# Patient Record
Sex: FEMALE | Race: WHITE | NOT HISPANIC OR LATINO | Employment: UNEMPLOYED | URBAN - METROPOLITAN AREA
[De-identification: names, ages, dates, MRNs, and addresses within clinical notes are randomized per-mention and may not be internally consistent; named-entity substitution may affect disease eponyms.]

---

## 2024-04-25 ENCOUNTER — OFFICE VISIT (OUTPATIENT)
Dept: FAMILY MEDICINE CLINIC | Facility: CLINIC | Age: 34
End: 2024-04-25
Payer: COMMERCIAL

## 2024-04-25 VITALS
HEART RATE: 78 BPM | WEIGHT: 164.4 LBS | RESPIRATION RATE: 16 BRPM | OXYGEN SATURATION: 99 % | SYSTOLIC BLOOD PRESSURE: 116 MMHG | TEMPERATURE: 97.3 F | HEIGHT: 63 IN | DIASTOLIC BLOOD PRESSURE: 84 MMHG | BODY MASS INDEX: 29.13 KG/M2

## 2024-04-25 DIAGNOSIS — R14.0 ABDOMINAL BLOATING WITH CRAMPS: ICD-10-CM

## 2024-04-25 DIAGNOSIS — Z13.220 LIPID SCREENING: ICD-10-CM

## 2024-04-25 DIAGNOSIS — E07.9 THYROID DISORDER: ICD-10-CM

## 2024-04-25 DIAGNOSIS — R53.82 CHRONIC FATIGUE: Primary | ICD-10-CM

## 2024-04-25 DIAGNOSIS — K21.9 CHRONIC GERD: ICD-10-CM

## 2024-04-25 DIAGNOSIS — Z11.59 NEED FOR HEPATITIS C SCREENING TEST: ICD-10-CM

## 2024-04-25 DIAGNOSIS — L70.0 ACNE VULGARIS: ICD-10-CM

## 2024-04-25 DIAGNOSIS — E55.9 VITAMIN D DEFICIENCY: ICD-10-CM

## 2024-04-25 DIAGNOSIS — R10.9 ABDOMINAL BLOATING WITH CRAMPS: ICD-10-CM

## 2024-04-25 PROCEDURE — 99204 OFFICE O/P NEW MOD 45 MIN: CPT | Performed by: FAMILY MEDICINE

## 2024-04-25 RX ORDER — CLINDAMYCIN AND BENZOYL PEROXIDE 10; 50 MG/G; MG/G
1 GEL TOPICAL 2 TIMES DAILY
Qty: 50 G | Refills: 4 | Status: SHIPPED | OUTPATIENT
Start: 2024-04-25

## 2024-04-25 RX ORDER — LANOLIN ALCOHOL/MO/W.PET/CERES
400 CREAM (GRAM) TOPICAL DAILY
COMMUNITY
Start: 2024-03-01

## 2024-04-25 NOTE — PROGRESS NOTES
Subjective     Andrew York is a 34 y.o. female.     Chief Complaint   Patient presents with    Establish Care     Discuss Conception.    Acne    Menstrual Problem     Painful periods, has been 2-3 days early.     Fatigue       History of Present Illness     To establish care, discuss the followings ;     6 months ago. Trying to conceive   Has regular cycle. Associated with cramps.   She is on daily Folic acid    Abd distention with dairy pd. and fatty food.     H/o thyroid dis, was borderline , +ve FHX for thyroid dis.   Feeling fatigue and tired with dry skin.     Vit D defi; was on suppl     Facial Acne ; on/off , worse at period time . Not using any things       Labs has been ordered and personally/independently interpreted , discussed with pt         The following portions of the patient's history were reviewed and updated as appropriate: allergies, current medications, past family history, past medical history, past social history, past surgical history, and problem list.        Review of Systems   Constitutional:  Positive for fatigue.       Vitals:    04/25/24 1124   BP: 116/84   Pulse: 78   Resp: 16   Temp: 97.3 °F (36.3 °C)   SpO2: 99%           04/25/24  1124   Weight: 74.6 kg (164 lb 6.4 oz)         Body mass index is 29.13 kg/m².      Current Outpatient Medications   Medication Sig Dispense Refill    folic acid (FOLVITE) 400 MCG tablet Take 1 tablet by mouth Daily.      multivitamin with minerals (Centrum Women) tablet tablet Take 1 tablet by mouth Daily.      clindamycin-benzoyl peroxide (BenzaClin) 1-5 % gel Apply 1 Application topically to the appropriate area as directed 2 (Two) Times a Day. 50 g 4     No current facility-administered medications for this visit.                Objective   Physical Exam  Vitals and nursing note reviewed.   Constitutional:       General: She is not in acute distress.     Appearance: She is not toxic-appearing.   Cardiovascular:      Rate and Rhythm: Normal rate  and regular rhythm.      Heart sounds: Normal heart sounds. No murmur heard.  Pulmonary:      Effort: Pulmonary effort is normal. No respiratory distress.      Breath sounds: Normal breath sounds. No stridor. No wheezing or rhonchi.   Abdominal:      General: Bowel sounds are normal. There is no distension.      Palpations: Abdomen is soft. There is no mass.      Tenderness: There is no abdominal tenderness. There is no guarding or rebound.      Hernia: No hernia is present.   Skin:     Comments: Facial acne    Neurological:      Mental Status: She is alert and oriented to person, place, and time.   Psychiatric:         Mood and Affect: Mood normal.         Behavior: Behavior normal.         Thought Content: Thought content normal.           Assessment & Plan   Diagnoses and all orders for this visit:    1. Chronic fatigue (Primary)  Comments:  needs w/u  Orders:  -     CBC (No Diff)  -     Iron Profile  -     Basic Metabolic Panel  -     Vitamin B12  -     Hemoglobin A1c    2. Abdominal bloating with cramps  Comments:  ? food intolerance/allergy  Orders:  -     Food Allergen Panel With / IgE    3. Chronic GERD  Comments:  discussed diet  Orders:  -     Food Allergen Panel With / IgE    4. Need for hepatitis C screening test  -     Hepatitis C Antibody    5. Vitamin D deficiency  Comments:  NEEDS W/U  Orders:  -     Vitamin D,25-Hydroxy    6. Thyroid disorder  Comments:  needs w/u  Orders:  -     TSH Rfx On Abnormal To Free T4  -     TSH    7. Lipid screening  -     Lipid Panel    8. Acne vulgaris  Comments:  will start on;  Orders:  -     clindamycin-benzoyl peroxide (BenzaClin) 1-5 % gel; Apply 1 Application topically to the appropriate area as directed 2 (Two) Times a Day.  Dispense: 50 g; Refill: 4      I spent 50 minutes caring for this patient on this date of service. This time includes time spent by me in the following activities:preparing for the visit,reviewing previous medical records,  performing a  medically appropriate examination and/or evaluation, counseling and educating the patient/family/caregiver, and documenting information in the medical record.     Labs/XR has been ordered and personally/independently interpreted , discussed with pt         Patient was given instructions and counseling regarding her condition or for health maintenance advice. Please see specific information pulled into the AVS if appropriate.       I have fully discussed the nature of the medical condition(s) risks, complications, management, safe and proper use of medications.   Pt stated no allergy to the above prescribed medication.  I have discussed the SIDE EFFECT OF MEDICATION and importance TO report any side effect , the patient expressed good understanding.  Encouraged medication compliance and the importance of keeping scheduled follow up appointments with me and any other providers.    Patient instructed to follow up with our office for results on any labs/imaging ordered during this visit.    Home care discussed  All questions answered  Patient verbalizes understanding and agrees to treatment plan.     Follow up: Return in about 4 weeks (around 5/23/2024) for discuss labs .

## 2024-04-30 ENCOUNTER — PATIENT MESSAGE (OUTPATIENT)
Dept: FAMILY MEDICINE CLINIC | Facility: CLINIC | Age: 34
End: 2024-04-30
Payer: COMMERCIAL

## 2024-05-01 NOTE — TELEPHONE ENCOUNTER
From: Andrew York  To: Sadiq Mcghee  Sent: 4/30/2024 8:53 PM EDT  Subject: Lab Results     Hi Dr. Babcock,  I still have not received any results from my lab tests. Please advise if they will appear on my chart.  Thanks,

## 2024-05-02 LAB
25(OH)D3+25(OH)D2 SERPL-MCNC: 27 NG/ML (ref 30–100)
BUN SERPL-MCNC: 16 MG/DL (ref 6–20)
BUN/CREAT SERPL: 29 (ref 9–23)
CALCIUM SERPL-MCNC: 9.5 MG/DL (ref 8.7–10.2)
CHLORIDE SERPL-SCNC: 103 MMOL/L (ref 96–106)
CHOLEST SERPL-MCNC: 205 MG/DL (ref 100–199)
CLAM IGE QN: <0.1 KU/L
CO2 SERPL-SCNC: 22 MMOL/L (ref 20–29)
CODFISH IGE QN: <0.1 KU/L
CONV CLASS DESCRIPTION: NORMAL
CORN IGE QN: <0.1 KU/L
COW MILK IGE QN: <0.1 KU/L
CREAT SERPL-MCNC: 0.55 MG/DL (ref 0.57–1)
EGFRCR SERPLBLD CKD-EPI 2021: 122 ML/MIN/1.73
EGG WHITE IGE QN: <0.1 KU/L
ERYTHROCYTE [DISTWIDTH] IN BLOOD BY AUTOMATED COUNT: 12.6 % (ref 11.7–15.4)
GLUCOSE SERPL-MCNC: 77 MG/DL (ref 70–99)
HBA1C MFR BLD: 5.3 % (ref 4.8–5.6)
HCT VFR BLD AUTO: 39.9 % (ref 34–46.6)
HCV IGG SERPL QL IA: NON REACTIVE
HDLC SERPL-MCNC: 74 MG/DL
HGB BLD-MCNC: 13.2 G/DL (ref 11.1–15.9)
IGE SERPL-ACNC: 101 IU/ML (ref 6–495)
IRON SATN MFR SERPL: 25 % (ref 15–55)
IRON SERPL-MCNC: 72 UG/DL (ref 27–159)
LDLC SERPL CALC-MCNC: 118 MG/DL (ref 0–99)
MCH RBC QN AUTO: 30.1 PG (ref 26.6–33)
MCHC RBC AUTO-ENTMCNC: 33.1 G/DL (ref 31.5–35.7)
MCV RBC AUTO: 91 FL (ref 79–97)
PEANUT IGE QN: <0.1 KU/L
PLATELET # BLD AUTO: 260 X10E3/UL (ref 150–450)
POTASSIUM SERPL-SCNC: 4.5 MMOL/L (ref 3.5–5.2)
RBC # BLD AUTO: 4.39 X10E6/UL (ref 3.77–5.28)
SCALLOP IGE QN: <0.1 KU/L
SHRIMP IGE QN: <0.1 KU/L
SODIUM SERPL-SCNC: 139 MMOL/L (ref 134–144)
SOYBEAN IGE QN: <0.1 KU/L
TIBC SERPL-MCNC: 291 UG/DL (ref 250–450)
TRIGL SERPL-MCNC: 73 MG/DL (ref 0–149)
TSH SERPL DL<=0.005 MIU/L-ACNC: 3.22 UIU/ML (ref 0.45–4.5)
TSH SERPL DL<=0.005 MIU/L-ACNC: NORMAL UIU/ML
UIBC SERPL-MCNC: 219 UG/DL (ref 131–425)
VIT B12 SERPL-MCNC: 306 PG/ML (ref 232–1245)
VLDLC SERPL CALC-MCNC: 13 MG/DL (ref 5–40)
WALNUT IGE QN: <0.1 KU/L
WBC # BLD AUTO: 4.6 X10E3/UL (ref 3.4–10.8)
WHEAT IGE QN: <0.1 KU/L

## 2024-05-02 NOTE — TELEPHONE ENCOUNTER
I reviewed her labs results   Nothing major, can wait to discuss during next OV   I prefer face to face discussion on results

## 2024-05-23 ENCOUNTER — OFFICE VISIT (OUTPATIENT)
Dept: FAMILY MEDICINE CLINIC | Facility: CLINIC | Age: 34
End: 2024-05-23
Payer: COMMERCIAL

## 2024-05-23 VITALS
WEIGHT: 162.04 LBS | SYSTOLIC BLOOD PRESSURE: 116 MMHG | OXYGEN SATURATION: 98 % | TEMPERATURE: 97.7 F | RESPIRATION RATE: 16 BRPM | BODY MASS INDEX: 28.71 KG/M2 | DIASTOLIC BLOOD PRESSURE: 80 MMHG | HEIGHT: 63 IN | HEART RATE: 80 BPM

## 2024-05-23 DIAGNOSIS — Z00.00 ENCOUNTER FOR ANNUAL PHYSICAL EXAM: Primary | ICD-10-CM

## 2024-05-23 PROCEDURE — 99395 PREV VISIT EST AGE 18-39: CPT | Performed by: FAMILY MEDICINE

## 2024-05-23 NOTE — PROGRESS NOTES
Patient Care Team:  Sadiq Mcghee MD as PCP - General (Urgent Care)     Chief complaint: Patient is in today for a physical          Patient is a 34 y.o. female who presents for her yearly physical exam.     HPI     Doing well.   Eating HD  .    Exercising routinely.   Immunizations: reviewed and discussed.     Labs has been ordered and personally/independently interpreted , discussed with pt     Lab Results   Component Value Date    GLUCOSE 77 04/25/2024    BUN 16 04/25/2024    CREATININE 0.55 (L) 04/25/2024    EGFRRESULT 122 04/25/2024    BCR 29 (H) 04/25/2024    K 4.5 04/25/2024    CO2 22 04/25/2024    CALCIUM 9.5 04/25/2024     Lab Results   Component Value Date    WBC 4.6 04/25/2024    HGB 13.2 04/25/2024    HCT 39.9 04/25/2024    MCV 91 04/25/2024     04/25/2024     Lab Results   Component Value Date    CHLPL 205 (H) 04/25/2024    TRIG 73 04/25/2024    HDL 74 04/25/2024     (H) 04/25/2024     Lab Results   Component Value Date    HGBA1C 5.3 04/25/2024         Health maintenance/lifestyle:    There is no immunization history on file for this patient.      HM;  Pap:  due     Social History     Tobacco Use   Smoking Status Never   Smokeless Tobacco Never     Social History     Substance and Sexual Activity   Alcohol Use Not Currently    Comment: Occassionly         Review of Systems   Gastrointestinal:  Negative for abdominal pain.         History  History reviewed. No pertinent past medical history.   History reviewed. No pertinent surgical history.   No Known Allergies   Family History   Problem Relation Age of Onset    Diabetes Mother     Hypertension Mother     Thyroid disease Mother         Hypothyroidism    Diabetes Father     Hypertension Father     Diabetes Maternal Grandfather         He is diabetes since his twenties    Diabetes Maternal Uncle         My uncle is diabetes    Hypertension Sister     Thyroid disease Sister         Hypothyroidism     Social History     Socioeconomic  "History    Marital status:    Tobacco Use    Smoking status: Never    Smokeless tobacco: Never   Vaping Use    Vaping status: Never Used   Substance and Sexual Activity    Alcohol use: Not Currently     Comment: Occassionly    Drug use: Never    Sexual activity: Yes     Partners: Male     Birth control/protection: None     Comment: Trying to concieve        Current Outpatient Medications:     clindamycin-benzoyl peroxide (BenzaClin) 1-5 % gel, Apply 1 Application topically to the appropriate area as directed 2 (Two) Times a Day., Disp: 50 g, Rfl: 4    folic acid (FOLVITE) 400 MCG tablet, Take 1 tablet by mouth Daily., Disp: , Rfl:     multivitamin with minerals (Centrum Women) tablet tablet, Take 1 tablet by mouth Daily., Disp: , Rfl:                   /80   Pulse 80   Temp 97.7 °F (36.5 °C)   Resp 16   Ht 160 cm (62.99\")   Wt 73.5 kg (162 lb 0.6 oz)   SpO2 98%   BMI 28.71 kg/m²       Physical Exam  Vitals and nursing note reviewed.   Constitutional:       General: She is not in acute distress.     Appearance: She is not toxic-appearing.   HENT:      Mouth/Throat:      Mouth: Mucous membranes are moist.   Eyes:      Conjunctiva/sclera: Conjunctivae normal.   Cardiovascular:      Rate and Rhythm: Normal rate and regular rhythm.      Heart sounds: Normal heart sounds. No murmur heard.  Pulmonary:      Effort: Pulmonary effort is normal. No respiratory distress.      Breath sounds: Normal breath sounds. No stridor. No wheezing or rhonchi.   Abdominal:      General: Bowel sounds are normal. There is no distension.      Palpations: Abdomen is soft. There is no mass.      Tenderness: There is no abdominal tenderness. There is no guarding or rebound.      Hernia: No hernia is present.   Musculoskeletal:      Cervical back: Neck supple.   Skin:     General: Skin is warm.      Coloration: Skin is not jaundiced or pale.   Neurological:      Mental Status: She is alert and oriented to person, place, and " time.   Psychiatric:         Mood and Affect: Mood normal.         Behavior: Behavior normal.         Thought Content: Thought content normal.                   Diagnoses and all orders for this visit:    1. Encounter for annual physical exam (Primary)  Comments:  discussed labs results  advised to take Vit D from OTC        -Age and sex appropriate physical exam performed and documented.   -Updated past medical, family, social and surgical histories as well as allergies and care team list.   -Addressed care gaps listed in the medical record.  -advised to eat healthy diet, do daily exercise   - discussed and updates preventive screening measures         Follow up: Return in about 6 months (around 11/23/2024).  Plan of care discussed with pt. They verbalized understanding and agreement.     Sadiq Mcghee MD   5/23/2024   13:15 EDT

## 2024-06-11 ENCOUNTER — PATIENT MESSAGE (OUTPATIENT)
Dept: FAMILY MEDICINE CLINIC | Facility: CLINIC | Age: 34
End: 2024-06-11
Payer: COMMERCIAL

## 2024-10-11 ENCOUNTER — OFFICE VISIT (OUTPATIENT)
Dept: OBSTETRICS AND GYNECOLOGY | Facility: CLINIC | Age: 34
End: 2024-10-11
Payer: COMMERCIAL

## 2024-10-11 VITALS
BODY MASS INDEX: 31.36 KG/M2 | HEART RATE: 82 BPM | DIASTOLIC BLOOD PRESSURE: 72 MMHG | WEIGHT: 177 LBS | HEIGHT: 63 IN | SYSTOLIC BLOOD PRESSURE: 131 MMHG

## 2024-10-11 DIAGNOSIS — N94.6 DYSMENORRHEA: ICD-10-CM

## 2024-10-11 DIAGNOSIS — Z01.419 WELL WOMAN EXAM WITH ROUTINE GYNECOLOGICAL EXAM: Primary | ICD-10-CM

## 2024-10-11 DIAGNOSIS — Z31.9 INFERTILITY MANAGEMENT: ICD-10-CM

## 2024-10-11 NOTE — PATIENT INSTRUCTIONS
[facility]   [place], [place]   [phone]    [facility]  [place], [place]   [phone]

## 2024-10-11 NOTE — PROGRESS NOTES
Chief Complaint   Patient presents with    Annual exam     Family planning     Establish Care        Andrew York is a 34 y.o.   who presents for an annual examination   She is sexually active with her , CAMPOS x 1 year since marrying in 2023.   Moved from [place].  who is here with her has normal SA results from [facility].  No proven fertility in either partner  She reports a normal US in [facility] in Aug 2023 (transabdominal)  Pap history:  None in the past  Prior abnormal paps: no  STDs  Sexually active: yes  History of STDs: no  Contraception:  None, on PNV  Last Completed Mammogram       This patient has no relevant Health Maintenance data.            Screening for BRCA-   Is patient's family history significant for BRCA risk factors? no    History reviewed. No pertinent past medical history.  History reviewed. No pertinent surgical history.  OB History    Para Term  AB Living   0 0 0 0 0 0   SAB IAB Ectopic Molar Multiple Live Births   0 0 0 0 0 0      Social History     Tobacco Use    Smoking status: Never    Smokeless tobacco: Never   Vaping Use    Vaping status: Never Used   Substance Use Topics    Alcohol use: Not Currently     Comment: Occassionly    Drug use: Never     Family History   Problem Relation Age of Onset    Diabetes Mother     Hypertension Mother     Thyroid disease Mother         Hypothyroidism    Diabetes Father     Hypertension Father     Diabetes Maternal Grandfather         He is diabetes since his twenties    Diabetes Maternal Uncle         My uncle is diabetes    Hypertension Sister     Thyroid disease Sister         Hypothyroidism     Current Outpatient Medications on File Prior to Visit   Medication Sig Dispense Refill    folic acid (FOLVITE) 400 MCG tablet Take 1 tablet by mouth Daily.      multivitamin with minerals (Centrum Women) tablet tablet Take 1 tablet by mouth Daily.      [DISCONTINUED] clindamycin-benzoyl peroxide (BenzaClin) 1-5 % gel Apply 1  "Application topically to the appropriate area as directed 2 (Two) Times a Day. 50 g 4     No current facility-administered medications on file prior to visit.     No Known Allergies     Review of Systems  See HPI      OBJECTIVE:   Vitals:    10/11/24 1446   BP: 131/72   Pulse: 82   Weight: 80.3 kg (177 lb)   Height: 160 cm (62.99\")      Physical Exam  Exam conducted with a chaperone present.   Constitutional:       General: She is not in acute distress.     Appearance: She is well-developed. She is not diaphoretic.   HENT:      Head: Normocephalic and atraumatic.   Neck:      Thyroid: No thyromegaly.      Trachea: No tracheal deviation.   Cardiovascular:      Rate and Rhythm: Normal rate.      Heart sounds: Normal heart sounds. No murmur heard.     No friction rub. No gallop.   Pulmonary:      Effort: Pulmonary effort is normal. No respiratory distress.      Breath sounds: Normal breath sounds.   Chest:      Chest wall: No tenderness.   Breasts:     Right: No inverted nipple, mass, nipple discharge, skin change or tenderness.      Left: No inverted nipple, mass, nipple discharge, skin change or tenderness.   Abdominal:      General: There is no distension.      Palpations: Abdomen is soft. There is no mass.      Tenderness: There is no abdominal tenderness.   Genitourinary:     General: Normal vulva.      Labia:         Right: No rash, lesion or injury.         Left: No rash, lesion or injury.       Vagina: No vaginal discharge, tenderness or bleeding.      Cervix: No cervical motion tenderness, discharge or friability.      Uterus: Not deviated, not enlarged, not fixed and not tender.       Adnexa:         Right: No mass, tenderness or fullness.          Left: No mass, tenderness or fullness.     Musculoskeletal:         General: No deformity. Normal range of motion.   Lymphadenopathy:      Cervical: No cervical adenopathy.   Skin:     General: Skin is warm and dry.      Findings: No rash.   Neurological:      " Mental Status: She is alert and oriented to person, place, and time.   Psychiatric:         Behavior: Behavior normal.         Thought Content: Thought content normal.         Judgment: Judgment normal.         ASSESSMENT/PLAN:   (Z01.419) Well woman exam with routine gynecological exam - Plan: IGP, Apt HPV,rfx 16 / 18,45    (Z31.9) Infertility management - Plan: Antimullerian Hormone (AMH)    (N94.6) Dysmenorrhea - Plan: US Non-ob Transvaginal      Annual well woman exam:  Cervical cancer screening:    Denies cervical dysplasia in past   The patient is due for a pap today.    Screening guidelines discussed with patient  Breast cancer screening:    Clinical breast exam recommended for age 20-39 years every 1-3 years   Mammogram recommended starting age 40    Breast self awareness encouraged  STD Screening   Testing declines.    Contraception :   Declines, on PNV  Will get AMH as had nl TSH, A1C with PCP  Plan for US around time of ovulation  Discussed option for [facility], [facility].  Plan for OI if all results normal     Family history   Denies significant hx of genetic disease, birth defect  Healthy lifestyle counseling:   return for routine annual checkups        Return for GYN US timed for around day 10-14 of her cycle.

## 2024-10-16 ENCOUNTER — PATIENT ROUNDING (BHMG ONLY) (OUTPATIENT)
Dept: OBSTETRICS AND GYNECOLOGY | Facility: CLINIC | Age: 34
End: 2024-10-16
Payer: COMMERCIAL

## 2024-10-16 ENCOUNTER — PATIENT MESSAGE (OUTPATIENT)
Dept: OBSTETRICS AND GYNECOLOGY | Facility: CLINIC | Age: 34
End: 2024-10-16
Payer: COMMERCIAL

## 2024-10-17 LAB
CYTOLOGIST CVX/VAG CYTO: NORMAL
CYTOLOGY CVX/VAG DOC CYTO: NORMAL
CYTOLOGY CVX/VAG DOC THIN PREP: NORMAL
DX ICD CODE: NORMAL
HPV I/H RISK 4 DNA CVX QL PROBE+SIG AMP: NEGATIVE
Lab: NORMAL
Lab: NORMAL
OTHER STN SPEC: NORMAL
SPECIMEN STATUS: NORMAL
STAT OF ADQ CVX/VAG CYTO-IMP: NORMAL

## 2024-10-18 ENCOUNTER — TELEPHONE (OUTPATIENT)
Dept: OBSTETRICS AND GYNECOLOGY | Facility: CLINIC | Age: 34
End: 2024-10-18
Payer: COMMERCIAL

## 2024-10-20 LAB — MIS SERPL-MCNC: 0.82 NG/ML

## 2024-10-24 ENCOUNTER — TELEPHONE (OUTPATIENT)
Dept: OBSTETRICS AND GYNECOLOGY | Facility: CLINIC | Age: 34
End: 2024-10-24
Payer: COMMERCIAL

## 2025-01-16 ENCOUNTER — OFFICE VISIT (OUTPATIENT)
Dept: FAMILY MEDICINE CLINIC | Facility: CLINIC | Age: 35
End: 2025-01-16
Payer: COMMERCIAL

## 2025-01-16 VITALS
HEIGHT: 63 IN | DIASTOLIC BLOOD PRESSURE: 80 MMHG | RESPIRATION RATE: 16 BRPM | HEART RATE: 81 BPM | WEIGHT: 193 LBS | SYSTOLIC BLOOD PRESSURE: 110 MMHG | BODY MASS INDEX: 34.2 KG/M2 | TEMPERATURE: 97.7 F | OXYGEN SATURATION: 99 %

## 2025-01-16 DIAGNOSIS — N97.9 FEMALE INFERTILITY, PRIMARY: Primary | ICD-10-CM

## 2025-01-16 DIAGNOSIS — E07.9 THYROID DISORDER: ICD-10-CM

## 2025-01-16 PROCEDURE — 99213 OFFICE O/P EST LOW 20 MIN: CPT | Performed by: FAMILY MEDICINE

## 2025-01-16 RX ORDER — LETROZOLE 2.5 MG/1
TABLET, FILM COATED ORAL 2 TIMES DAILY
COMMUNITY
Start: 2024-11-28

## 2025-01-16 RX ORDER — LEVOTHYROXINE SODIUM 75 UG/1
75 TABLET ORAL
COMMUNITY
Start: 2025-01-11

## 2025-01-16 RX ORDER — CHORIOGONADOTROPIN ALFA 250 UG/.5ML
INJECTION, SOLUTION SUBCUTANEOUS
COMMUNITY
Start: 2025-01-03

## 2025-01-16 NOTE — PROGRESS NOTES
Subjective     Andrew York is a 34 y.o. female.     Chief Complaint   Patient presents with    Chronic fatigue    Weight Gain     Discuss.     hormone medication     Discuss     Follow-up     Discuss vitamin D and cholesterol, and DHEA         History of Present Illness     Presents for FU and update on the followings;     She is  less than 12 months ago. Trying to conceive     Primary infertility   Saw GYN in 10/2024  Had normal PAP and normal Pelvic US.  AMH test 0.8   Was referred to [facility] .   If below 1 , starts follow reproductive health   DHEA takes it daily for 2 months   Had IUI 2 x as she had good Follicles size   Her Thyroid marker wnl   She is on Letrozole for 2 months cycle.   Had IUI done with one follicle   She stopped DHEA   On Q 10   Follow at [facility] for reproductive health   Has regular cycle. Associated with cramps.   She is on daily Folic acid          Lab Results   Component Value Date    TSH 3.220 04/25/2024    TSH CANCELED 04/25/2024         The following portions of the patient's history were reviewed and updated as appropriate: allergies, current medications, past family history, past medical history, past social history, past surgical history, and problem list.        Review of Systems   Genitourinary:  Negative for menstrual problem.       Vitals:    01/16/25 1619   BP: 110/80   Pulse: 81   Resp: 16   Temp: 97.7 °F (36.5 °C)   SpO2: 99%           01/16/25  1619   Weight: 87.5 kg (193 lb)         Body mass index is 34.2 kg/m².      Current Outpatient Medications   Medication Sig Dispense Refill    Coenzyme Q10 300 MG capsule Take  by mouth Daily.      folic acid (FOLVITE) 400 MCG tablet Take 1 tablet by mouth Daily.      letrozole (FEMARA) 2.5 MG tablet 2 (Two) Times a Day. Please see attached for detailed directions, daily for 5 days.      levothyroxine (SYNTHROID, LEVOTHROID) 75 MCG tablet Take 1 tablet by mouth Every Morning.      multivitamin with minerals (Centrum Women) tablet  tablet Take 1 tablet by mouth Daily.      Prenatal MV-Min-Fe Fum-FA-DHA (PRENATAL 1 PO) Take  by mouth.      Ovidrel 250 MCG/0.5ML solution prefilled syringe INJECT 1 PREFILLED SYRINGE SUBCUTANEOUSLY (UNDER THE SKIN) AS DIRECTED BY PHYSICIAN FOR A SINGLE DOSE KEEP IN FRIDGE UNTIL USE.       No current facility-administered medications for this visit.                Objective   Physical Exam  Vitals and nursing note reviewed.   Constitutional:       General: She is not in acute distress.     Appearance: She is not toxic-appearing.   Neck:      Comments: No enlarged thyroid      Cardiovascular:      Rate and Rhythm: Normal rate and regular rhythm.      Heart sounds: Normal heart sounds. No murmur heard.  Neurological:      Mental Status: She is alert and oriented to person, place, and time.   Psychiatric:         Mood and Affect: Mood normal.         Behavior: Behavior normal.         Thought Content: Thought content normal.           Assessment & Plan   Diagnoses and all orders for this visit:    1. Female infertility, primary (Primary)  Comments:  following with IR  close monitoring    2. Thyroid disorder  Comments:  last TSH wnl  close monitoring             I spent 22 minutes caring for this patient on this date of service. This time includes time spent by me in the following activities:preparing for the visit,reviewing previous medical records,  performing a medically appropriate examination and/or evaluation, counseling and educating the patient/family/caregiver, and documenting information in the medical record.     Patient was given instructions and counseling regarding her condition or for health maintenance advice. Please see specific information pulled into the AVS if appropriate.         I have fully discussed the nature of the medical condition(s) risks, complications, management, safe and proper use of medications.   Encouraged medication compliance and the importance of keeping scheduled follow up  appointments with me and any other providers.    Patient instructed to follow up with our office for results on any labs/imaging ordered during this visit.    Home care discussed  All questions answered  Patient verbalizes understanding and agrees to treatment plan.     Follow up: Return in about 4 months (around 5/16/2025) for physical, come fasting.

## 2025-07-23 ENCOUNTER — OFFICE VISIT (OUTPATIENT)
Dept: FAMILY MEDICINE CLINIC | Facility: CLINIC | Age: 35
End: 2025-07-23
Payer: COMMERCIAL

## 2025-07-23 VITALS
WEIGHT: 182 LBS | BODY MASS INDEX: 32.25 KG/M2 | TEMPERATURE: 95.2 F | HEIGHT: 63 IN | DIASTOLIC BLOOD PRESSURE: 64 MMHG | SYSTOLIC BLOOD PRESSURE: 118 MMHG | RESPIRATION RATE: 16 BRPM

## 2025-07-23 DIAGNOSIS — E78.5 DYSLIPIDEMIA: ICD-10-CM

## 2025-07-23 DIAGNOSIS — R14.0 ABDOMINAL BLOATING WITH CRAMPS: ICD-10-CM

## 2025-07-23 DIAGNOSIS — E07.9 THYROID DISORDER: ICD-10-CM

## 2025-07-23 DIAGNOSIS — R73.02 IGT (IMPAIRED GLUCOSE TOLERANCE): ICD-10-CM

## 2025-07-23 DIAGNOSIS — R21 RASH: ICD-10-CM

## 2025-07-23 DIAGNOSIS — Z00.00 ENCOUNTER FOR ANNUAL PHYSICAL EXAM: Primary | ICD-10-CM

## 2025-07-23 DIAGNOSIS — R10.9 ABDOMINAL BLOATING WITH CRAMPS: ICD-10-CM

## 2025-07-23 DIAGNOSIS — E55.9 VITAMIN D DEFICIENCY: ICD-10-CM

## 2025-07-23 PROCEDURE — 99395 PREV VISIT EST AGE 18-39: CPT | Performed by: FAMILY MEDICINE

## 2025-07-23 RX ORDER — TRIAMCINOLONE ACETONIDE 1 MG/G
1 OINTMENT TOPICAL 2 TIMES DAILY
Qty: 80 G | Refills: 1 | Status: SHIPPED | OUTPATIENT
Start: 2025-07-23

## 2025-07-23 RX ORDER — DICYCLOMINE HYDROCHLORIDE 10 MG/1
10 CAPSULE ORAL
Qty: 60 CAPSULE | Refills: 1 | Status: SHIPPED | OUTPATIENT
Start: 2025-07-23

## 2025-07-23 NOTE — PROGRESS NOTES
Patient Care Team:  Sadiq Mcghee MD as PCP - General (Urgent Care)     Chief complaint: Patient is in today for a physical          Patient is a 35 y.o. female who presents for her yearly physical exam.     HPI     Follow with Infertility , on Rx. Plan for IVF    Vit D defi; not on suppl    Has on/off abdominal discomfort with distension, has normal BM    C/o rash on LE, itchy    Eating HD . Exercising routinely.     Immunizations: reviewed and discussed.     Lab Results   Component Value Date    CHLPL 205 (H) 04/25/2024    TRIG 73 04/25/2024    HDL 74 04/25/2024     (H) 04/25/2024     Lab Results   Component Value Date    TSH 3.220 04/25/2024    TSH CANCELED 04/25/2024       Lab Results   Component Value Date    GLUCOSE 77 04/25/2024    BUN 16 04/25/2024    CREATININE 0.55 (L) 04/25/2024     04/25/2024    K 4.5 04/25/2024     04/25/2024    CALCIUM 9.5 04/25/2024    BCR 29 (H) 04/25/2024    EGFR 122 04/25/2024         Health maintenance/lifestyle:  Immunization History   Administered Date(s) Administered    COVID-19 (PFIZER) 12YRS+ (COMIRNATY) 10/01/2024         HM;  Colorectal Screening:  Start at 45.  Pap:  follow with GYN      Social History     Tobacco Use   Smoking Status Never   Smokeless Tobacco Never     Social History     Substance and Sexual Activity   Alcohol Use Not Currently    Comment: Occassionly           History  History reviewed. No pertinent past medical history.   History reviewed. No pertinent surgical history.   No Known Allergies   Family History   Problem Relation Age of Onset    Diabetes Mother     Hypertension Mother     Thyroid disease Mother         Hypothyroidism    Diabetes Father     Hypertension Father     Diabetes Maternal Grandfather         He is diabetes since his twenties    Diabetes Maternal Uncle         My uncle is diabetes    Hypertension Sister     Thyroid disease Sister         Hypothyroidism     Social History     Socioeconomic History    Marital  "status:    Tobacco Use    Smoking status: Never    Smokeless tobacco: Never   Vaping Use    Vaping status: Never Used   Substance and Sexual Activity    Alcohol use: Not Currently     Comment: Occassionly    Drug use: Never    Sexual activity: Yes     Partners: Male     Birth control/protection: None     Comment: Trying to concieve        Current Outpatient Medications:     Coenzyme Q10 300 MG capsule, Take  by mouth Daily., Disp: , Rfl:     folic acid (FOLVITE) 400 MCG tablet, Take 1 tablet by mouth Daily., Disp: , Rfl:     levothyroxine (SYNTHROID, LEVOTHROID) 75 MCG tablet, Take 1 tablet by mouth Every Morning., Disp: , Rfl:     multivitamin with minerals (Centrum Women) tablet tablet, Take 1 tablet by mouth Daily., Disp: , Rfl:     Prenatal MV-Min-Fe Fum-FA-DHA (PRENATAL 1 PO), Take  by mouth., Disp: , Rfl:     dicyclomine (BENTYL) 10 MG capsule, Take 1 capsule by mouth 4 (Four) Times a Day Before Meals & at Bedtime., Disp: 60 capsule, Rfl: 1    triamcinolone (KENALOG) 0.1 % ointment, Apply 1 Application topically to the appropriate area as directed 2 (Two) Times a Day., Disp: 80 g, Rfl: 1                  /64   Temp 95.2 °F (35.1 °C)   Resp 16   Ht 160 cm (62.99\")   Wt 82.6 kg (182 lb)   BMI 32.25 kg/m²       Physical Exam  Vitals and nursing note reviewed.   Constitutional:       General: She is not in acute distress.     Appearance: She is not ill-appearing, toxic-appearing or diaphoretic.   HENT:      Head: Normocephalic.      Nose: Nose normal. No congestion or rhinorrhea.      Mouth/Throat:      Mouth: Mucous membranes are moist.      Pharynx: Oropharynx is clear.   Eyes:      General: No scleral icterus.     Extraocular Movements: Extraocular movements intact.      Conjunctiva/sclera: Conjunctivae normal.      Pupils: Pupils are equal, round, and reactive to light.   Neck:      Comments: No enlarged thyroid      Cardiovascular:      Rate and Rhythm: Normal rate and regular rhythm.      " Heart sounds: Normal heart sounds. No murmur heard.  Pulmonary:      Effort: Pulmonary effort is normal. No respiratory distress.      Breath sounds: Normal breath sounds. No wheezing, rhonchi or rales.   Abdominal:      General: Bowel sounds are normal. There is no distension.      Palpations: Abdomen is soft. There is no mass.      Tenderness: There is no abdominal tenderness.   Musculoskeletal:         General: Normal range of motion.      Cervical back: Normal range of motion and neck supple.      Right lower leg: No edema.      Left lower leg: No edema.   Skin:     General: Skin is warm.      Coloration: Skin is not jaundiced or pale.      Findings: Rash present.   Neurological:      General: No focal deficit present.      Mental Status: She is alert and oriented to person, place, and time.   Psychiatric:         Mood and Affect: Mood normal.         Behavior: Behavior normal.         Thought Content: Thought content normal.         Judgment: Judgment normal.                   Diagnoses and all orders for this visit:    1. Encounter for annual physical exam (Primary)  -     CBC (No Diff)  -     Comprehensive Metabolic Panel    2. Thyroid disorder  -     TSH    3. IGT (impaired glucose tolerance)  -     Hemoglobin A1c    4. Dyslipidemia  -     Lipid Panel    5. Vitamin D deficiency  -     Vitamin D 25 hydroxy    6. Abdominal bloating with cramps  -     dicyclomine (BENTYL) 10 MG capsule; Take 1 capsule by mouth 4 (Four) Times a Day Before Meals & at Bedtime.  Dispense: 60 capsule; Refill: 1    7. Rash  -     triamcinolone (KENALOG) 0.1 % ointment; Apply 1 Application topically to the appropriate area as directed 2 (Two) Times a Day.  Dispense: 80 g; Refill: 1      -Age and sex appropriate physical exam performed and documented.   -Updated past medical, family, social and surgical histories as well as allergies and care team list.   -Addressed care gaps listed in the medical record.  -advised to eat healthy diet,  do daily exercise   - discussed and updates preventive screening measures       Follow up: Return in about 7 months (around 2/23/2026) for follow up on current illness.  Plan of care discussed with pt. They verbalized understanding and agreement.     Sadiq Mcghee MD   7/23/2025   15:07 EDT             Ok with Mychart lab result

## 2025-07-24 LAB
25(OH)D3+25(OH)D2 SERPL-MCNC: 45.8 NG/ML (ref 30–100)
ALBUMIN SERPL-MCNC: 4.8 G/DL (ref 3.5–5.2)
ALBUMIN/GLOB SERPL: 1.7 G/DL
ALP SERPL-CCNC: 55 U/L (ref 39–117)
ALT SERPL-CCNC: 11 U/L (ref 1–33)
AST SERPL-CCNC: 15 U/L (ref 1–32)
BILIRUB SERPL-MCNC: 0.3 MG/DL (ref 0–1.2)
BUN SERPL-MCNC: 14 MG/DL (ref 6–20)
BUN/CREAT SERPL: 19.2 (ref 7–25)
CALCIUM SERPL-MCNC: 9.7 MG/DL (ref 8.6–10.5)
CHLORIDE SERPL-SCNC: 102 MMOL/L (ref 98–107)
CHOLEST SERPL-MCNC: 211 MG/DL (ref 0–200)
CO2 SERPL-SCNC: 24.4 MMOL/L (ref 22–29)
CREAT SERPL-MCNC: 0.73 MG/DL (ref 0.57–1)
EGFRCR SERPLBLD CKD-EPI 2021: 108.8 ML/MIN/1.73
ERYTHROCYTE [DISTWIDTH] IN BLOOD BY AUTOMATED COUNT: 12.3 % (ref 12.3–15.4)
GLOBULIN SER CALC-MCNC: 2.9 GM/DL
GLUCOSE SERPL-MCNC: 85 MG/DL (ref 65–99)
HBA1C MFR BLD: 5 % (ref 4.8–5.6)
HCT VFR BLD AUTO: 41.3 % (ref 34–46.6)
HDLC SERPL-MCNC: 75 MG/DL (ref 40–60)
HGB BLD-MCNC: 13.6 G/DL (ref 12–15.9)
LDLC SERPL CALC-MCNC: 127 MG/DL (ref 0–100)
MCH RBC QN AUTO: 30.2 PG (ref 26.6–33)
MCHC RBC AUTO-ENTMCNC: 32.9 G/DL (ref 31.5–35.7)
MCV RBC AUTO: 91.6 FL (ref 79–97)
PLATELET # BLD AUTO: 293 10*3/MM3 (ref 140–450)
POTASSIUM SERPL-SCNC: 4.6 MMOL/L (ref 3.5–5.2)
PROT SERPL-MCNC: 7.7 G/DL (ref 6–8.5)
RBC # BLD AUTO: 4.51 10*6/MM3 (ref 3.77–5.28)
SODIUM SERPL-SCNC: 138 MMOL/L (ref 136–145)
TRIGL SERPL-MCNC: 50 MG/DL (ref 0–150)
TSH SERPL DL<=0.005 MIU/L-ACNC: 3.02 UIU/ML (ref 0.27–4.2)
VLDLC SERPL CALC-MCNC: 9 MG/DL (ref 5–40)
WBC # BLD AUTO: 4.93 10*3/MM3 (ref 3.4–10.8)

## 2025-07-31 ENCOUNTER — PATIENT MESSAGE (OUTPATIENT)
Dept: FAMILY MEDICINE CLINIC | Facility: CLINIC | Age: 35
End: 2025-07-31
Payer: COMMERCIAL

## 2025-08-07 DIAGNOSIS — R10.9 ABDOMINAL BLOATING WITH CRAMPS: ICD-10-CM

## 2025-08-07 DIAGNOSIS — R14.0 ABDOMINAL BLOATING WITH CRAMPS: ICD-10-CM

## 2025-08-07 RX ORDER — DICYCLOMINE HYDROCHLORIDE 10 MG/1
10 CAPSULE ORAL 2 TIMES DAILY PRN
Qty: 90 CAPSULE | Refills: 1 | Status: SHIPPED | OUTPATIENT
Start: 2025-08-07